# Patient Record
Sex: FEMALE | URBAN - METROPOLITAN AREA
[De-identification: names, ages, dates, MRNs, and addresses within clinical notes are randomized per-mention and may not be internally consistent; named-entity substitution may affect disease eponyms.]

---

## 2020-06-01 ENCOUNTER — OFFICE VISIT (OUTPATIENT)
Dept: URBAN - METROPOLITAN AREA CLINIC 109 | Facility: CLINIC | Age: 20
End: 2020-06-01

## 2023-08-18 ENCOUNTER — OFFICE VISIT (OUTPATIENT)
Dept: URBAN - METROPOLITAN AREA CLINIC 40 | Facility: CLINIC | Age: 23
End: 2023-08-18
Payer: COMMERCIAL

## 2023-08-18 ENCOUNTER — LAB OUTSIDE AN ENCOUNTER (OUTPATIENT)
Dept: URBAN - METROPOLITAN AREA CLINIC 40 | Facility: CLINIC | Age: 23
End: 2023-08-18

## 2023-08-18 ENCOUNTER — WEB ENCOUNTER (OUTPATIENT)
Dept: URBAN - METROPOLITAN AREA CLINIC 40 | Facility: CLINIC | Age: 23
End: 2023-08-18

## 2023-08-18 VITALS
WEIGHT: 140.6 LBS | TEMPERATURE: 97.3 F | SYSTOLIC BLOOD PRESSURE: 112 MMHG | DIASTOLIC BLOOD PRESSURE: 80 MMHG | HEIGHT: 61 IN | BODY MASS INDEX: 26.55 KG/M2 | HEART RATE: 90 BPM

## 2023-08-18 DIAGNOSIS — K92.1 BLOODY STOOL: ICD-10-CM

## 2023-08-18 DIAGNOSIS — R10.84 GENERALIZED ABDOMINAL PAIN: ICD-10-CM

## 2023-08-18 DIAGNOSIS — R19.7 ACUTE DIARRHEA: ICD-10-CM

## 2023-08-18 DIAGNOSIS — K51.00 ULCERATIVE PANCOLITIS WITHOUT COMPLICATION: ICD-10-CM

## 2023-08-18 PROBLEM — 444548001: Status: ACTIVE | Noted: 2023-08-18

## 2023-08-18 PROCEDURE — 99203 OFFICE O/P NEW LOW 30 MIN: CPT | Performed by: NURSE PRACTITIONER

## 2023-08-18 RX ORDER — COLESTIPOL HYDROCHLORIDE 1 G/1
1 TABLET TABLET, FILM COATED ORAL TWICE A DAY
Qty: 60 TABLET | Refills: 0 | OUTPATIENT
Start: 2023-08-18

## 2023-08-18 RX ORDER — DICYCLOMINE HYDROCHLORIDE 10 MG/1
1 CAPSULE 30 MINUTES BEFORE EATING CAPSULE ORAL THREE TIMES A DAY
Qty: 90 | Refills: 0 | OUTPATIENT
Start: 2023-08-18 | End: 2023-09-17

## 2023-08-18 RX ORDER — ADALIMUMAB 40MG/0.4ML
0.4 ML KIT SUBCUTANEOUS
Status: ACTIVE | COMMUNITY

## 2023-08-18 NOTE — HPI-TODAY'S VISIT:
The patient is a 23-year-old female new patient to this practice referred here to Dr. Wick by Dr. Romo of children's [facility] for ulcerative colitis in 2020 to transition to adult GI care. There are records of pancolitis and treatment with Humira, colonoscopy 2016 showing inflammation from hepatic flexure to rectum.  -- She states that she moved to [facility] for school after finishing up with [facility] and Dr. Romo. She has been seen by [facility] gastroenterology there, and states that she has had 2 colonoscopies since 2016, the latest one was 2 weeks ago.  She states that Dr. Aguirre  thinks that she might have Crohn's instead of ulcerative colitis.  She was given paperwork and the colonoscopy report to bring to us but she left it at home.  She also states that [facility] was to have faxed over all her information since she is now moved back here to live.  She says that she has not had lab work done since maybe May or June, but was told it all looked good.   She states that during the colonoscopy 2 weeks ago, biopsies were done but the pathology was not back yet. She continues on Humira.  She states that the past couple of weeks she noticed more mucus and blood in her stool, as well as having sharp stomach cramps.  Aside from that she says she is feeling okay, denies any fever, any strange foods or recent antibiotics.

## 2023-08-22 LAB
ADENOVIRUS F 40/41: NOT DETECTED
CALPROTECTIN, STOOL - QDX: (no result)
CAMPYLOBACTER: NOT DETECTED
CLOSTRIDIUM DIFFICILE: DETECTED
ENTAMOEBA HISTOLYTICA: NOT DETECTED
ENTEROAGGREGATIVE E.COLI: NOT DETECTED
ENTEROTOXIGENIC E.COLI: NOT DETECTED
ESCHERICHIA COLI O157: NOT DETECTED
FECAL FAT, QUALITATIVE: (no result)
GIARDIA LAMBLIA: NOT DETECTED
H. PYLORI (EIA) - QDX: NEGATIVE
LACTOFERRIN SCAN (QUANTITATIVE) - QDX: NEGATIVE
NOROVIRUS GI/GII: NOT DETECTED
ROTAVIRUS A: NOT DETECTED
SALMONELLA SPP.: NOT DETECTED
SHIGA-LIKE TOXIN PRODUCING E.COLI: NOT DETECTED
SHIGELLA SPP. / ENTEROINVASIVE E.COLI: NOT DETECTED
STOOL, WHITE BLOOD CELLS: (no result)
VIBRIO PARAHAEMOLYTICUS: NOT DETECTED
VIBRIO SPP.: NOT DETECTED
YERSINIA ENTEROCOLITICA: NOT DETECTED

## 2023-08-23 ENCOUNTER — TELEPHONE ENCOUNTER (OUTPATIENT)
Dept: URBAN - METROPOLITAN AREA CLINIC 40 | Facility: CLINIC | Age: 23
End: 2023-08-23

## 2023-08-28 ENCOUNTER — TELEPHONE ENCOUNTER (OUTPATIENT)
Dept: URBAN - METROPOLITAN AREA CLINIC 40 | Facility: CLINIC | Age: 23
End: 2023-08-28

## 2023-08-30 LAB
A/G RATIO: 1.4
ABSOLUTE BASOPHILS: 21
ABSOLUTE EOSINOPHILS: 218
ABSOLUTE LYMPHOCYTES: 1529
ABSOLUTE MONOCYTES: 510
ABSOLUTE NEUTROPHILS: 2922
ADALIMUMAB LEVEL, IBD: 6.5
ALBUMIN: 4.2
ALKALINE PHOSPHATASE: 66
ALT (SGPT): 13
AST (SGOT): 13
BASOPHILS: 0.4
BILIRUBIN, TOTAL: 0.2
BUN/CREATININE RATIO: (no result)
BUN: 7
C-REACTIVE PROTEIN, QUANT: 2.4
CALCIUM: 9.5
CARBON DIOXIDE, TOTAL: 26
CHLORIDE: 104
COMMENT: (no result)
CREATININE: 0.68
EGFR: 125
EOSINOPHILS: 4.2
FOLATE (FOLIC ACID), SERUM: 13.3
GGT: 14
GLOBULIN, TOTAL: 3.1
GLUCOSE: 81
HEMATOCRIT: 36.7
HEMOGLOBIN: 12.6
INTERPRETATION: (no result)
LYMPHOCYTES: 29.4
MCH: 29.2
MCHC: 34.3
MCV: 85
MONOCYTES: 9.8
MPV: 10.4
NEUTROPHILS: 56.2
PLATELET COUNT: 274
POTASSIUM: 4
PROTEIN, TOTAL: 7.3
RDW: 13.2
RED BLOOD CELL COUNT: 4.32
SED RATE BY MODIFIED: 9
SODIUM: 141
VITAMIN B12: 678
VITAMIN D,25-OH,TOTAL,IA: 26
WHITE BLOOD CELL COUNT: 5.2

## 2023-09-11 ENCOUNTER — OFFICE VISIT (OUTPATIENT)
Dept: URBAN - METROPOLITAN AREA CLINIC 40 | Facility: CLINIC | Age: 23
End: 2023-09-11

## 2023-09-11 RX ORDER — COLESTIPOL HYDROCHLORIDE 1 G/1
1 TABLET TABLET, FILM COATED ORAL TWICE A DAY
Qty: 60 TABLET | Refills: 0 | OUTPATIENT

## 2023-09-11 RX ORDER — DICYCLOMINE HYDROCHLORIDE 10 MG/1
1 CAPSULE 30 MINUTES BEFORE EATING CAPSULE ORAL THREE TIMES A DAY
Qty: 90 | Refills: 0 | OUTPATIENT

## 2023-09-11 RX ORDER — DICYCLOMINE HYDROCHLORIDE 10 MG/1
1 CAPSULE 30 MINUTES BEFORE EATING CAPSULE ORAL THREE TIMES A DAY
Qty: 90 | Refills: 0 | COMMUNITY
Start: 2023-08-18 | End: 2023-09-17

## 2023-09-11 RX ORDER — ADALIMUMAB 40MG/0.4ML
0.4 ML KIT SUBCUTANEOUS
COMMUNITY

## 2023-09-11 RX ORDER — COLESTIPOL HYDROCHLORIDE 1 G/1
1 TABLET TABLET, FILM COATED ORAL TWICE A DAY
Qty: 60 TABLET | Refills: 0 | COMMUNITY
Start: 2023-08-18

## 2023-09-11 NOTE — HPI-TODAY'S VISIT:
Follow up IBD/UC Seen by NP/SC a few weeks ago with flare of IBD. Stool, pos fecal calprotectin greater than 600, many wbcs, H.pylori neg. CT abd/pelvis ordered but not done. Rx for dicyclomine. Pt is on Humira long term.   --------- Prior note: The patient is a 23-year-old female new patient to this practice referred here to Dr. iWck by Dr. Romo of [facility] for ulcerative colitis in 2020 to transition to adult GI care. There are records of pancolitis and treatment with Humira, colonoscopy 2016 showing inflammation from hepatic flexure to rectum.  -- She states that she moved to [facility] for school after finishing up with [facility] and Dr. Romo. She has been seen by [facility] gastroenterology there, and states that she has had 2 colonoscopies since 2016, the latest one was 2 weeks ago.  She states that Dr. Aguirre  thinks that she might have Crohn's instead of ulcerative colitis.  She was given paperwork and the colonoscopy report to bring to us but she left it at home.  She also states that [facility] was to have faxed over all her information since she is now moved back here to live.  She says that she has not had lab work done since maybe May or June, but was told it all looked good.    She states that during the colonoscopy 2 weeks ago, biopsies were done but the pathology was not back yet. She continues on Humira.  She states that the past couple of weeks she noticed more mucus and blood in her stool, as well as having sharp stomach cramps.  Aside from that she says she is feeling okay, denies any fever, any strange foods or recent antibiotics.

## 2023-09-13 ENCOUNTER — ERX REFILL RESPONSE (OUTPATIENT)
Dept: URBAN - METROPOLITAN AREA CLINIC 40 | Facility: CLINIC | Age: 23
End: 2023-09-13

## 2023-09-13 RX ORDER — COLESTIPOL HYDROCHLORIDE 1 G/1
TAKE 1 TABLET BY MOUTH TWICE A DAY AS NEEDED FOR DIARRHEA TABLET, FILM COATED ORAL
Qty: 60 TABLET | Refills: 0 | OUTPATIENT

## 2023-09-13 RX ORDER — COLESTIPOL HYDROCHLORIDE 1 G/1
1 TABLET TABLET, FILM COATED ORAL TWICE A DAY
Qty: 60 TABLET | Refills: 0 | OUTPATIENT

## 2023-09-13 RX ORDER — DICYCLOMINE HYDROCHLORIDE 10 MG/1
1 CAPSULE 30 MINUTES BEFORE EATING CAPSULE ORAL THREE TIMES A DAY
Qty: 90 | Refills: 0 | OUTPATIENT

## 2023-09-13 RX ORDER — DICYCLOMINE HYDROCHLORIDE 10 MG/1
TAKE 1 CAPSULE BY MOUTH THREE TIMES A DAY BEFORE MEALS FOR 30 DAYS CAPSULE ORAL
Qty: 90 CAPSULE | Refills: 0 | OUTPATIENT

## 2023-09-21 ENCOUNTER — TELEPHONE ENCOUNTER (OUTPATIENT)
Dept: URBAN - METROPOLITAN AREA CLINIC 40 | Facility: CLINIC | Age: 23
End: 2023-09-21

## 2023-09-22 ENCOUNTER — TELEPHONE ENCOUNTER (OUTPATIENT)
Dept: URBAN - METROPOLITAN AREA CLINIC 40 | Facility: CLINIC | Age: 23
End: 2023-09-22

## 2023-09-22 RX ORDER — COLESTIPOL HYDROCHLORIDE 1 G/1
TAKE 1 TABLET BY MOUTH TWICE A DAY AS NEEDED FOR DIARRHEA TABLET, FILM COATED ORAL
Qty: 60 TABLET | Refills: 0 | Status: ACTIVE | COMMUNITY

## 2023-09-22 RX ORDER — DICYCLOMINE HYDROCHLORIDE 10 MG/1
TAKE 1 CAPSULE BY MOUTH THREE TIMES A DAY BEFORE MEALS FOR 30 DAYS CAPSULE ORAL
Qty: 90 CAPSULE | Refills: 0 | Status: ACTIVE | COMMUNITY

## 2023-09-22 RX ORDER — PREDNISONE 10 MG/1
3 TABLETS ONCE A DAY FOR 7 DAYS, 2 TABLETS ONCE A DAY FOR 7 DAYS, 1 TABLET ONCE A DAY FOR 14 DAYS TABLET ORAL
Qty: 49 TABLET | Refills: 0 | OUTPATIENT
Start: 2023-09-22 | End: 2023-10-20

## 2023-09-22 RX ORDER — ADALIMUMAB 40MG/0.4ML
0.4 ML KIT SUBCUTANEOUS
COMMUNITY

## 2023-09-26 ENCOUNTER — OFFICE VISIT (OUTPATIENT)
Dept: URBAN - METROPOLITAN AREA CLINIC 40 | Facility: CLINIC | Age: 23
End: 2023-09-26

## 2023-09-27 ENCOUNTER — ERX REFILL RESPONSE (OUTPATIENT)
Dept: URBAN - METROPOLITAN AREA CLINIC 40 | Facility: CLINIC | Age: 23
End: 2023-09-27

## 2023-09-27 RX ORDER — COLESTIPOL HYDROCHLORIDE 1 G/1
TAKE 1 TABLET BY MOUTH TWICE A DAY AS NEEDED FOR DIARRHEA 30 TABLET, FILM COATED ORAL
Qty: 180 TABLET | Refills: 1 | OUTPATIENT

## 2023-09-27 RX ORDER — COLESTIPOL HYDROCHLORIDE 1 G/1
TAKE 1 TABLET BY MOUTH TWICE A DAY AS NEEDED FOR DIARRHEA TABLET, FILM COATED ORAL
Qty: 60 TABLET | Refills: 0 | OUTPATIENT

## 2023-09-27 RX ORDER — DICYCLOMINE HYDROCHLORIDE 10 MG/1
TAKE 1 CAPSULE BY MOUTH THREE TIMES A DAY BEFORE MEALS FOR 30 DAYS CAPSULE ORAL
Qty: 90 CAPSULE | Refills: 0 | OUTPATIENT

## 2023-09-27 RX ORDER — DICYCLOMINE HYDROCHLORIDE 10 MG/1
TAKE 1 CAPSULE BY MOUTH THREE TIMES A DAY BEFORE MEALS FOR 30 DAYS CAPSULE ORAL
Qty: 270 CAPSULE | Refills: 1 | OUTPATIENT

## 2023-10-16 ENCOUNTER — TELEPHONE ENCOUNTER (OUTPATIENT)
Dept: URBAN - METROPOLITAN AREA CLINIC 40 | Facility: CLINIC | Age: 23
End: 2023-10-16

## 2023-10-16 RX ORDER — ADALIMUMAB 40MG/0.4ML
0.4 ML KIT SUBCUTANEOUS
Qty: 1 | Refills: 0

## 2023-10-18 ENCOUNTER — OFFICE VISIT (OUTPATIENT)
Dept: URBAN - METROPOLITAN AREA CLINIC 40 | Facility: CLINIC | Age: 23
End: 2023-10-18

## 2023-10-19 ENCOUNTER — TELEPHONE ENCOUNTER (OUTPATIENT)
Dept: URBAN - METROPOLITAN AREA CLINIC 40 | Facility: CLINIC | Age: 23
End: 2023-10-19

## 2023-10-23 ENCOUNTER — OFFICE VISIT (OUTPATIENT)
Dept: URBAN - METROPOLITAN AREA CLINIC 40 | Facility: CLINIC | Age: 23
End: 2023-10-23
Payer: COMMERCIAL

## 2023-10-23 VITALS
WEIGHT: 138.4 LBS | DIASTOLIC BLOOD PRESSURE: 84 MMHG | BODY MASS INDEX: 26.13 KG/M2 | SYSTOLIC BLOOD PRESSURE: 120 MMHG | HEART RATE: 88 BPM | TEMPERATURE: 97.9 F | HEIGHT: 61 IN

## 2023-10-23 DIAGNOSIS — K51.00 ULCERATIVE PANCOLITIS WITHOUT COMPLICATION: ICD-10-CM

## 2023-10-23 PROCEDURE — 99214 OFFICE O/P EST MOD 30 MIN: CPT | Performed by: INTERNAL MEDICINE

## 2023-10-23 RX ORDER — ADALIMUMAB 40MG/0.4ML
0.4 ML KIT SUBCUTANEOUS
Status: ACTIVE | COMMUNITY

## 2023-10-23 RX ORDER — DICYCLOMINE HYDROCHLORIDE 10 MG/1
TAKE 1 CAPSULE BY MOUTH THREE TIMES A DAY BEFORE MEALS FOR 30 DAYS CAPSULE ORAL
Qty: 270 CAPSULE | Refills: 1 | Status: ON HOLD | COMMUNITY

## 2023-10-23 RX ORDER — COLESTIPOL HYDROCHLORIDE 1 G/1
TAKE 1 TABLET BY MOUTH TWICE A DAY AS NEEDED FOR DIARRHEA 30 TABLET, FILM COATED ORAL
Qty: 180 TABLET | Refills: 1 | Status: ON HOLD | COMMUNITY

## 2023-10-23 RX ORDER — ADALIMUMAB 40MG/0.4ML
0.4 ML KIT SUBCUTANEOUS
Qty: 1 | Refills: 0

## 2023-10-23 NOTE — HPI-TODAY'S VISIT:
Follow up IBD/UC Seen by NP/SC a few weeks ago with flare of IBD. Stool, pos fecal calprotectin greater than 600, many wbcs, H.pylori neg. Stool shows C.diff carrier status, toxin negative. CT abd/pelvis negative, no active inflammation on imaging and no small bowel involvement. B12/Folate normal, VitD 26. Humira level 6.5, but Ab not checked. Rx for dicyclomine. Pt is on Humira long term. She feels much better now after steroid taper. But she is out of Humira and overdue (last was over a month ago) and Insurance PA is pending for her Humira pens.    --------- Prior note: The patient is a 23-year-old female new patient to this practice referred here to Dr. Wick by Dr. Romo of [facility] for ulcerative colitis in 2020 to transition to adult GI care. There are records of pancolitis and treatment with Humira, colonoscopy 2016 showing inflammation from hepatic flexure to rectum.  -- She states that she moved to [facility] for school after finishing up with [facility] and Dr. Romo. She has been seen by [facility] gastroenterology there, and states that she has had 2 colonoscopies since 2016, the latest one was 2 weeks ago.  She states that Dr. Aguirre  thinks that she might have Crohn's instead of ulcerative colitis.  She was given paperwork and the colonoscopy report to bring to us but she left it at home.  She also states that [facility] was to have faxed over all her information since she is now moved back here to live.  She says that she has not had lab work done since maybe May or June, but was told it all looked good.    She states that during the colonoscopy 2 weeks ago, biopsies were done but the pathology was not back yet. She continues on Humira.   She states that the past couple of weeks she noticed more mucus and blood in her stool, as well as having sharp stomach cramps.    Aside from that she says she is feeling okay, denies any fever, any strange foods or recent antibiotics.

## 2023-10-24 ENCOUNTER — TELEPHONE ENCOUNTER (OUTPATIENT)
Dept: URBAN - METROPOLITAN AREA CLINIC 40 | Facility: CLINIC | Age: 23
End: 2023-10-24

## 2023-10-26 ENCOUNTER — TELEPHONE ENCOUNTER (OUTPATIENT)
Dept: URBAN - METROPOLITAN AREA CLINIC 74 | Facility: CLINIC | Age: 23
End: 2023-10-26

## 2023-10-26 RX ORDER — ADALIMUMAB 40MG/0.4ML
1 PEN KIT SUBCUTANEOUS WEEKLY
Qty: 4 PEN NEEDLE | Refills: 5 | OUTPATIENT
Start: 2023-10-26 | End: 2024-04-23

## 2023-10-27 ENCOUNTER — TELEPHONE ENCOUNTER (OUTPATIENT)
Dept: URBAN - METROPOLITAN AREA CLINIC 40 | Facility: CLINIC | Age: 23
End: 2023-10-27

## 2023-10-31 ENCOUNTER — TELEPHONE ENCOUNTER (OUTPATIENT)
Dept: URBAN - METROPOLITAN AREA CLINIC 40 | Facility: CLINIC | Age: 23
End: 2023-10-31

## 2023-11-02 ENCOUNTER — TELEPHONE ENCOUNTER (OUTPATIENT)
Dept: URBAN - METROPOLITAN AREA CLINIC 40 | Facility: CLINIC | Age: 23
End: 2023-11-02

## 2023-11-03 ENCOUNTER — TELEPHONE ENCOUNTER (OUTPATIENT)
Dept: URBAN - METROPOLITAN AREA CLINIC 40 | Facility: CLINIC | Age: 23
End: 2023-11-03

## 2023-11-03 RX ORDER — UPADACITINIB 30 MG/1
1 TABLET TABLET, EXTENDED RELEASE ORAL ONCE A DAY
Qty: 30 | Refills: 5 | OUTPATIENT
Start: 2023-11-03 | End: 2024-05-01

## 2023-11-03 NOTE — HPI-TODAY'S VISIT:
Humira PA was not completed. Pt wishes to switch to Rinvoq. Samples 45mg daily for 8 weeks provided for free. Plan: Start Rinvoq 45mg daily 8 weeks then decrease to 30mg daily Check labs and lipids now Rx sent to [facility] for Rinvoq 30mg

## 2023-11-06 ENCOUNTER — TELEPHONE ENCOUNTER (OUTPATIENT)
Dept: URBAN - METROPOLITAN AREA CLINIC 74 | Facility: CLINIC | Age: 23
End: 2023-11-06

## 2023-11-08 ENCOUNTER — TELEPHONE ENCOUNTER (OUTPATIENT)
Dept: URBAN - METROPOLITAN AREA CLINIC 74 | Facility: CLINIC | Age: 23
End: 2023-11-08

## 2023-11-09 ENCOUNTER — TELEPHONE ENCOUNTER (OUTPATIENT)
Dept: URBAN - METROPOLITAN AREA CLINIC 40 | Facility: CLINIC | Age: 23
End: 2023-11-09

## 2023-11-14 LAB
A/G RATIO: 1.6
ALBUMIN: 4.4
ALKALINE PHOSPHATASE: 61
ALT (SGPT): 16
AST (SGOT): 15
BILIRUBIN, TOTAL: 0.3
BUN/CREATININE RATIO: (no result)
BUN: 13
CALCIUM: 9.6
CARBON DIOXIDE, TOTAL: 27
CHLORIDE: 104
CHOL/HDLC RATIO: 3
CHOLESTEROL, TOTAL: 173
CREATININE: 0.61
EGFR: 128
GLOBULIN, TOTAL: 2.7
GLUCOSE: 97
HDL CHOLESTEROL: 58
HEMATOCRIT: 36.6
HEMOGLOBIN: 12.4
LDL CHOLESTEROL CALC: 101
MCH: 29.4
MCHC: 33.9
MCV: 86.7
MITOGEN-NIL: >10
MPV: 11.3
NON HDL CHOLESTEROL: 115
PLATELET COUNT: 232
POTASSIUM: 3.9
PROTEIN, TOTAL: 7.1
QUANTIFERON NIL VALUE: 0.03
QUANTIFERON TB1 AG VALUE: 0
QUANTIFERON TB2 AG VALUE: 0
QUANTIFERON-TB GOLD PLUS: NEGATIVE
RDW: 12.7
RED BLOOD CELL COUNT: 4.22
SODIUM: 139
TRIGLYCERIDES: 46
WHITE BLOOD CELL COUNT: 3.6

## 2023-12-01 ENCOUNTER — TELEPHONE ENCOUNTER (OUTPATIENT)
Dept: URBAN - METROPOLITAN AREA CLINIC 74 | Facility: CLINIC | Age: 23
End: 2023-12-01

## 2024-01-17 ENCOUNTER — TELEPHONE ENCOUNTER (OUTPATIENT)
Dept: URBAN - METROPOLITAN AREA CLINIC 40 | Facility: CLINIC | Age: 24
End: 2024-01-17

## 2024-01-22 ENCOUNTER — TELEPHONE ENCOUNTER (OUTPATIENT)
Dept: URBAN - METROPOLITAN AREA CLINIC 40 | Facility: CLINIC | Age: 24
End: 2024-01-22

## 2024-01-26 ENCOUNTER — TELEPHONE ENCOUNTER (OUTPATIENT)
Dept: URBAN - METROPOLITAN AREA CLINIC 40 | Facility: CLINIC | Age: 24
End: 2024-01-26

## 2024-01-26 ENCOUNTER — OFFICE VISIT (OUTPATIENT)
Dept: URBAN - METROPOLITAN AREA CLINIC 40 | Facility: CLINIC | Age: 24
End: 2024-01-26
Payer: COMMERCIAL

## 2024-01-26 VITALS
WEIGHT: 138.8 LBS | HEART RATE: 82 BPM | SYSTOLIC BLOOD PRESSURE: 108 MMHG | HEIGHT: 61 IN | DIASTOLIC BLOOD PRESSURE: 74 MMHG | BODY MASS INDEX: 26.21 KG/M2 | TEMPERATURE: 97.5 F

## 2024-01-26 DIAGNOSIS — K51.00 ULCERATIVE PANCOLITIS WITHOUT COMPLICATION: ICD-10-CM

## 2024-01-26 PROCEDURE — 99213 OFFICE O/P EST LOW 20 MIN: CPT | Performed by: INTERNAL MEDICINE

## 2024-01-26 RX ORDER — COLESTIPOL HYDROCHLORIDE 1 G/1
TAKE 1 TABLET BY MOUTH TWICE A DAY AS NEEDED FOR DIARRHEA 30 TABLET, FILM COATED ORAL
Qty: 180 TABLET | Refills: 1 | Status: ACTIVE | COMMUNITY

## 2024-01-26 RX ORDER — ADALIMUMAB 40MG/0.4ML
0.4 ML KIT SUBCUTANEOUS
Qty: 1 | Refills: 0 | Status: ACTIVE | COMMUNITY

## 2024-01-26 RX ORDER — UPADACITINIB 30 MG/1
1 TABLET TABLET, EXTENDED RELEASE ORAL ONCE A DAY
Qty: 30 | Refills: 5 | Status: ACTIVE | COMMUNITY
Start: 2023-11-03 | End: 2024-05-01

## 2024-01-26 RX ORDER — DICYCLOMINE HYDROCHLORIDE 10 MG/1
TAKE 1 CAPSULE BY MOUTH THREE TIMES A DAY BEFORE MEALS FOR 30 DAYS CAPSULE ORAL
Qty: 270 CAPSULE | Refills: 1 | Status: ACTIVE | COMMUNITY

## 2024-01-26 RX ORDER — ADALIMUMAB 40MG/0.4ML
1 PEN KIT SUBCUTANEOUS WEEKLY
Qty: 4 PEN NEEDLE | Refills: 5 | Status: ON HOLD | COMMUNITY
Start: 2023-10-26 | End: 2024-04-23

## 2024-01-26 RX ORDER — UPADACITINIB 30 MG/1
1 TABLET TABLET, EXTENDED RELEASE ORAL ONCE A DAY
Qty: 90 TABLET | Refills: 3 | OUTPATIENT
Start: 2024-01-26 | End: 2025-01-20

## 2024-01-26 NOTE — HPI-TODAY'S VISIT:
Follow up UC. Last seen 10/2023. Humira PA was not completed. Pt stopped Humira and switche to Rinvoq. Samples 45mg daily for 8 weeks provided for free. Long delay in PA for Rinvoq. Normal labs and lipids and Quant Gold 11/2023. Rx sent to [facility] for Rinvoq 30mg Follow up IBD/UC  Stool shows C.diff carrier status, toxin negative. CT abd/pelvis negative, no active inflammation on imaging and no small bowel involvement. B12/Folate normal, VitD 26.  Pt was on Humira long term. She feels much better now after steroid taper. But she is out of Humira and overdue (last was over a month ago) and Insurance PA is pending for her Humira pens.

## 2024-01-31 LAB
A/G RATIO: 1.8
ALBUMIN: 4.7
ALKALINE PHOSPHATASE: 51
ALT (SGPT): 14
AST (SGOT): 15
BILIRUBIN, TOTAL: 0.3
BUN/CREATININE RATIO: (no result)
BUN: 9
CALCIUM: 9.6
CARBON DIOXIDE, TOTAL: 25
CHLORIDE: 105
CHOL/HDLC RATIO: 3.3
CHOLESTEROL, TOTAL: 184
CREATININE: 0.67
EGFR: 125
GLOBULIN, TOTAL: 2.6
GLUCOSE: 72
HDL CHOLESTEROL: 55
LDL CHOLESTEROL CALC: 113
NON HDL CHOLESTEROL: 129
POTASSIUM: 4.3
PROTEIN, TOTAL: 7.3
SODIUM: 139
TRIGLYCERIDES: 70

## 2024-03-01 ENCOUNTER — OV EP (OUTPATIENT)
Dept: URBAN - METROPOLITAN AREA CLINIC 40 | Facility: CLINIC | Age: 24
End: 2024-03-01
Payer: COMMERCIAL

## 2024-03-01 VITALS
BODY MASS INDEX: 26.66 KG/M2 | WEIGHT: 141.2 LBS | TEMPERATURE: 97.9 F | SYSTOLIC BLOOD PRESSURE: 110 MMHG | HEART RATE: 80 BPM | DIASTOLIC BLOOD PRESSURE: 76 MMHG | HEIGHT: 61 IN

## 2024-03-01 DIAGNOSIS — K51.00 ULCERATIVE PANCOLITIS WITHOUT COMPLICATION: ICD-10-CM

## 2024-03-01 PROCEDURE — 99214 OFFICE O/P EST MOD 30 MIN: CPT | Performed by: INTERNAL MEDICINE

## 2024-03-01 RX ORDER — COLESTIPOL HYDROCHLORIDE 1 G/1
TAKE 1 TABLET BY MOUTH TWICE A DAY AS NEEDED FOR DIARRHEA 30 TABLET, FILM COATED ORAL
Qty: 180 TABLET | Refills: 1 | Status: ACTIVE | COMMUNITY

## 2024-03-01 RX ORDER — ADALIMUMAB 40MG/0.4ML
1 PEN KIT SUBCUTANEOUS WEEKLY
Qty: 4 PEN NEEDLE | Refills: 5 | Status: ON HOLD | COMMUNITY
Start: 2023-10-26 | End: 2024-04-23

## 2024-03-01 RX ORDER — PREDNISONE 10 MG/1
2 TABLETS TABLET ORAL ONCE A DAY
Qty: 60 TABLET | Refills: 3 | Status: ACTIVE | COMMUNITY
Start: 2024-02-23 | End: 2024-06-22

## 2024-03-01 RX ORDER — UPADACITINIB 30 MG/1
1 TABLET TABLET, EXTENDED RELEASE ORAL ONCE A DAY
Qty: 90 TABLET | Refills: 3 | Status: ACTIVE | COMMUNITY
Start: 2024-01-26 | End: 2025-01-26

## 2024-03-01 RX ORDER — DICYCLOMINE HYDROCHLORIDE 10 MG/1
TAKE 1 CAPSULE BY MOUTH THREE TIMES A DAY BEFORE MEALS FOR 30 DAYS CAPSULE ORAL
Qty: 270 CAPSULE | Refills: 1 | Status: ACTIVE | COMMUNITY

## 2024-03-01 RX ORDER — ADALIMUMAB 40MG/0.4ML
0.4 ML KIT SUBCUTANEOUS
Qty: 1 | Refills: 0 | Status: ON HOLD | COMMUNITY

## 2024-03-01 RX ORDER — MESALAMINE 4 G/60ML
ONE 4GM KIT NIGHTLY SUSPENSION RECTAL DAILY
Qty: 60 | Refills: 1 | Status: ACTIVE | COMMUNITY
Start: 2024-02-01 | End: 2024-05-31

## 2024-03-01 RX ORDER — MESALAMINE 4 G/60ML
ONE 4GM KIT NIGHTLY SUSPENSION RECTAL DAILY
Qty: 30 | Refills: 1 | Status: ACTIVE | COMMUNITY
Start: 2024-02-09 | End: 2024-04-09

## 2024-03-01 RX ORDER — UPADACITINIB 30 MG/1
1 TABLET TABLET, EXTENDED RELEASE ORAL ONCE A DAY
Qty: 30 | Refills: 5 | Status: ACTIVE | COMMUNITY
Start: 2023-11-03 | End: 2024-05-01

## 2024-03-01 NOTE — HPI-TODAY'S VISIT:
Follow up UC.  Humira PA was not completed. Pt stopped Humira and switche to Rinvoq. Samples 45mg daily for 8 weeks provided for free.  Long delay in PA for Rinvoq. Normal labs and lipids and Quant Gold 11/2023. Rx sent to [facility] for Rinvoq 30mg Follow up IBD/UC, approved. Unfortunately, she is not responding to Rinvoq and has ongoing bleeding, pain, tenesmus. She would like to switch back to Humira. Now on Prednisone 20mg. Stool shows C.diff carrier status, toxin negative. CT abd/pelvis negative, no active inflammation on imaging and no small bowel involvement. B12/Folate normal, VitD 26.  Pt was on Humira long term.

## 2024-05-06 ENCOUNTER — TELEPHONE ENCOUNTER (OUTPATIENT)
Dept: URBAN - METROPOLITAN AREA CLINIC 40 | Facility: CLINIC | Age: 24
End: 2024-05-06

## 2024-05-06 RX ORDER — ADALIMUMAB 40MG/0.4ML
1 PEN KIT SUBCUTANEOUS WEEKLY
Qty: 4 PEN NEEDLE | Refills: 5
Start: 2023-10-26 | End: 2024-11-02

## 2024-05-07 ENCOUNTER — TELEPHONE ENCOUNTER (OUTPATIENT)
Dept: URBAN - METROPOLITAN AREA CLINIC 40 | Facility: CLINIC | Age: 24
End: 2024-05-07

## 2024-05-07 RX ORDER — ADALIMUMAB 40MG/0.4ML
1 PEN KIT SUBCUTANEOUS WEEKLY
Qty: 4 PEN NEEDLE | Refills: 5
Start: 2023-10-26 | End: 2024-11-04

## 2024-05-24 ENCOUNTER — OFFICE VISIT (OUTPATIENT)
Dept: URBAN - METROPOLITAN AREA CLINIC 40 | Facility: CLINIC | Age: 24
End: 2024-05-24